# Patient Record
Sex: FEMALE | Race: BLACK OR AFRICAN AMERICAN | NOT HISPANIC OR LATINO | URBAN - METROPOLITAN AREA
[De-identification: names, ages, dates, MRNs, and addresses within clinical notes are randomized per-mention and may not be internally consistent; named-entity substitution may affect disease eponyms.]

---

## 2018-12-10 ENCOUNTER — EMERGENCY (EMERGENCY)
Facility: HOSPITAL | Age: 23
LOS: 1 days | Discharge: HOME | End: 2018-12-10
Attending: EMERGENCY MEDICINE | Admitting: EMERGENCY MEDICINE
Payer: MEDICAID

## 2018-12-10 VITALS
WEIGHT: 130.07 LBS | SYSTOLIC BLOOD PRESSURE: 130 MMHG | HEART RATE: 98 BPM | DIASTOLIC BLOOD PRESSURE: 84 MMHG | OXYGEN SATURATION: 98 % | RESPIRATION RATE: 16 BRPM | TEMPERATURE: 98 F

## 2018-12-10 DIAGNOSIS — Z88.1 ALLERGY STATUS TO OTHER ANTIBIOTIC AGENTS STATUS: ICD-10-CM

## 2018-12-10 DIAGNOSIS — Z91.018 ALLERGY TO OTHER FOODS: ICD-10-CM

## 2018-12-10 DIAGNOSIS — Z79.2 LONG TERM (CURRENT) USE OF ANTIBIOTICS: ICD-10-CM

## 2018-12-10 DIAGNOSIS — B34.9 VIRAL INFECTION, UNSPECIFIED: ICD-10-CM

## 2018-12-10 DIAGNOSIS — Z88.2 ALLERGY STATUS TO SULFONAMIDES: ICD-10-CM

## 2018-12-10 DIAGNOSIS — Z91.012 ALLERGY TO EGGS: ICD-10-CM

## 2018-12-10 DIAGNOSIS — Z88.0 ALLERGY STATUS TO PENICILLIN: ICD-10-CM

## 2018-12-10 DIAGNOSIS — Z91.010 ALLERGY TO PEANUTS: ICD-10-CM

## 2018-12-10 DIAGNOSIS — Z79.899 OTHER LONG TERM (CURRENT) DRUG THERAPY: ICD-10-CM

## 2018-12-10 DIAGNOSIS — R11.0 NAUSEA: ICD-10-CM

## 2018-12-10 DIAGNOSIS — Z90.5 ACQUIRED ABSENCE OF KIDNEY: Chronic | ICD-10-CM

## 2018-12-10 DIAGNOSIS — Z79.52 LONG TERM (CURRENT) USE OF SYSTEMIC STEROIDS: ICD-10-CM

## 2018-12-10 DIAGNOSIS — Z91.040 LATEX ALLERGY STATUS: ICD-10-CM

## 2018-12-10 DIAGNOSIS — Z98.89 OTHER SPECIFIED POSTPROCEDURAL STATES: Chronic | ICD-10-CM

## 2018-12-10 PROCEDURE — 99284 EMERGENCY DEPT VISIT MOD MDM: CPT | Mod: 25

## 2018-12-10 RX ORDER — KETOROLAC TROMETHAMINE 30 MG/ML
30 SYRINGE (ML) INJECTION ONCE
Qty: 0 | Refills: 0 | Status: DISCONTINUED | OUTPATIENT
Start: 2018-12-10 | End: 2018-12-11

## 2018-12-10 RX ORDER — SODIUM CHLORIDE 9 MG/ML
1000 INJECTION INTRAMUSCULAR; INTRAVENOUS; SUBCUTANEOUS ONCE
Qty: 0 | Refills: 0 | Status: COMPLETED | OUTPATIENT
Start: 2018-12-10 | End: 2018-12-10

## 2018-12-10 RX ORDER — ONDANSETRON 8 MG/1
4 TABLET, FILM COATED ORAL ONCE
Qty: 0 | Refills: 0 | Status: COMPLETED | OUTPATIENT
Start: 2018-12-10 | End: 2018-12-10

## 2018-12-11 VITALS
OXYGEN SATURATION: 100 % | DIASTOLIC BLOOD PRESSURE: 87 MMHG | HEART RATE: 90 BPM | RESPIRATION RATE: 16 BRPM | TEMPERATURE: 98 F | SYSTOLIC BLOOD PRESSURE: 115 MMHG

## 2018-12-11 LAB
ALBUMIN SERPL ELPH-MCNC: 4.1 G/DL — SIGNIFICANT CHANGE UP (ref 3.3–5)
ALP SERPL-CCNC: 65 U/L — SIGNIFICANT CHANGE UP (ref 40–120)
ALT FLD-CCNC: SIGNIFICANT CHANGE UP U/L (ref 10–45)
ANION GAP SERPL CALC-SCNC: 17 MMOL/L — SIGNIFICANT CHANGE UP (ref 5–17)
APPEARANCE UR: CLEAR — SIGNIFICANT CHANGE UP
AST SERPL-CCNC: SIGNIFICANT CHANGE UP U/L (ref 10–40)
BACTERIA # UR AUTO: PRESENT /HPF
BASOPHILS NFR BLD AUTO: 0.9 % — SIGNIFICANT CHANGE UP (ref 0–2)
BILIRUB SERPL-MCNC: 0.5 MG/DL — SIGNIFICANT CHANGE UP (ref 0.2–1.2)
BILIRUB UR-MCNC: NEGATIVE — SIGNIFICANT CHANGE UP
BUN SERPL-MCNC: 14 MG/DL — SIGNIFICANT CHANGE UP (ref 7–23)
CALCIUM SERPL-MCNC: 9.3 MG/DL — SIGNIFICANT CHANGE UP (ref 8.4–10.5)
CHLORIDE SERPL-SCNC: 101 MMOL/L — SIGNIFICANT CHANGE UP (ref 96–108)
CO2 SERPL-SCNC: 20 MMOL/L — LOW (ref 22–31)
COLOR SPEC: YELLOW — SIGNIFICANT CHANGE UP
CREAT SERPL-MCNC: 0.94 MG/DL — SIGNIFICANT CHANGE UP (ref 0.5–1.3)
DIFF PNL FLD: ABNORMAL
EOSINOPHIL NFR BLD AUTO: 0.7 % — SIGNIFICANT CHANGE UP (ref 0–6)
EPI CELLS # UR: SIGNIFICANT CHANGE UP /HPF (ref 0–5)
GLUCOSE SERPL-MCNC: 115 MG/DL — HIGH (ref 70–99)
GLUCOSE UR QL: NEGATIVE — SIGNIFICANT CHANGE UP
HCT VFR BLD CALC: 37.7 % — SIGNIFICANT CHANGE UP (ref 34.5–45)
HGB BLD-MCNC: 12.7 G/DL — SIGNIFICANT CHANGE UP (ref 11.5–15.5)
HYALINE CASTS # UR AUTO: SIGNIFICANT CHANGE UP /LPF (ref 0–2)
KETONES UR-MCNC: NEGATIVE — SIGNIFICANT CHANGE UP
LEUKOCYTE ESTERASE UR-ACNC: NEGATIVE — SIGNIFICANT CHANGE UP
LIDOCAIN IGE QN: 44 U/L — SIGNIFICANT CHANGE UP (ref 7–60)
LYMPHOCYTES # BLD AUTO: 52.6 % — HIGH (ref 13–44)
MCHC RBC-ENTMCNC: 26 PG — LOW (ref 27–34)
MCHC RBC-ENTMCNC: 33.7 G/DL — SIGNIFICANT CHANGE UP (ref 32–36)
MCV RBC AUTO: 77.3 FL — LOW (ref 80–100)
MONOCYTES NFR BLD AUTO: 13.2 % — SIGNIFICANT CHANGE UP (ref 2–14)
NEUTROPHILS NFR BLD AUTO: 32.6 % — LOW (ref 43–77)
NITRITE UR-MCNC: NEGATIVE — SIGNIFICANT CHANGE UP
PH UR: 7 — SIGNIFICANT CHANGE UP (ref 5–8)
PLATELET # BLD AUTO: 242 K/UL — SIGNIFICANT CHANGE UP (ref 150–400)
POTASSIUM SERPL-MCNC: SIGNIFICANT CHANGE UP MMOL/L (ref 3.5–5.3)
POTASSIUM SERPL-SCNC: SIGNIFICANT CHANGE UP MMOL/L (ref 3.5–5.3)
PROT SERPL-MCNC: 8.2 G/DL — SIGNIFICANT CHANGE UP (ref 6–8.3)
PROT UR-MCNC: 100 MG/DL
RAPID RVP RESULT: SIGNIFICANT CHANGE UP
RBC # BLD: 4.88 M/UL — SIGNIFICANT CHANGE UP (ref 3.8–5.2)
RBC # FLD: 14 % — SIGNIFICANT CHANGE UP (ref 10.3–16.9)
RBC CASTS # UR COMP ASSIST: ABNORMAL /HPF
SODIUM SERPL-SCNC: 138 MMOL/L — SIGNIFICANT CHANGE UP (ref 135–145)
SP GR SPEC: 1.02 — SIGNIFICANT CHANGE UP (ref 1–1.03)
UROBILINOGEN FLD QL: 0.2 E.U./DL — SIGNIFICANT CHANGE UP
WBC # BLD: 5.6 K/UL — SIGNIFICANT CHANGE UP (ref 3.8–10.5)
WBC # FLD AUTO: 5.6 K/UL — SIGNIFICANT CHANGE UP (ref 3.8–10.5)
WBC UR QL: ABNORMAL /HPF

## 2018-12-11 PROCEDURE — 36415 COLL VENOUS BLD VENIPUNCTURE: CPT

## 2018-12-11 PROCEDURE — 83690 ASSAY OF LIPASE: CPT

## 2018-12-11 PROCEDURE — 87486 CHLMYD PNEUM DNA AMP PROBE: CPT

## 2018-12-11 PROCEDURE — 99284 EMERGENCY DEPT VISIT MOD MDM: CPT | Mod: 25

## 2018-12-11 PROCEDURE — 85025 COMPLETE CBC W/AUTO DIFF WBC: CPT

## 2018-12-11 PROCEDURE — 80053 COMPREHEN METABOLIC PANEL: CPT

## 2018-12-11 PROCEDURE — 87184 SC STD DISK METHOD PER PLATE: CPT

## 2018-12-11 PROCEDURE — 87086 URINE CULTURE/COLONY COUNT: CPT

## 2018-12-11 PROCEDURE — 96374 THER/PROPH/DIAG INJ IV PUSH: CPT

## 2018-12-11 PROCEDURE — 87633 RESP VIRUS 12-25 TARGETS: CPT

## 2018-12-11 PROCEDURE — 87581 M.PNEUMON DNA AMP PROBE: CPT

## 2018-12-11 PROCEDURE — 87798 DETECT AGENT NOS DNA AMP: CPT

## 2018-12-11 PROCEDURE — 96375 TX/PRO/DX INJ NEW DRUG ADDON: CPT

## 2018-12-11 PROCEDURE — 81001 URINALYSIS AUTO W/SCOPE: CPT

## 2018-12-11 RX ADMIN — Medication 30 MILLIGRAM(S): at 02:24

## 2018-12-11 RX ADMIN — ONDANSETRON 4 MILLIGRAM(S): 8 TABLET, FILM COATED ORAL at 01:02

## 2018-12-11 RX ADMIN — SODIUM CHLORIDE 1000 MILLILITER(S): 9 INJECTION INTRAMUSCULAR; INTRAVENOUS; SUBCUTANEOUS at 01:01

## 2018-12-11 NOTE — ED PROVIDER NOTE - OBJECTIVE STATEMENT
24 y/o f with PMH of congenial hypoplastic kidney s/p nephrectomy at age 1 presents to ED with generalized symptoms of myalgia, intermittent fever.  Pt self-catheterizes suprapubic area for urine.  Has had multiple UTIs.  Complains of mild nausea.  Denies vomiting, fever, chills in ED.  No sore throat, cough, congestion.  No abd pain, no diarrhea.

## 2018-12-11 NOTE — ED PROVIDER NOTE - NSFOLLOWUPINSTRUCTIONS_ED_ALL_ED_FT
Follow up with your PMD this week.    Continue to drink fluids and take tylenol for fever and joint pain.    Return to ED with worsening symptoms or other concerns.

## 2018-12-11 NOTE — ED PROVIDER NOTE - MEDICAL DECISION MAKING DETAILS
Pt with fever, chills, myalgia, hx of self-catherization suprapubic and hx of UTIs, pe well appearing, no focal findings on exam, labs, rvp, u.a done with no obvious bacteria infection - most likely viral syndrome and recommend supportive care of fluids, tylenol.

## 2018-12-14 LAB
-  CLINDAMYCIN: SIGNIFICANT CHANGE UP
-  ERYTHROMYCIN: SIGNIFICANT CHANGE UP
-  LEVOFLOXACIN: SIGNIFICANT CHANGE UP
-  PENICILLIN: SIGNIFICANT CHANGE UP
-  VANCOMYCIN: SIGNIFICANT CHANGE UP
METHOD TYPE: SIGNIFICANT CHANGE UP
METHOD TYPE: SIGNIFICANT CHANGE UP

## 2018-12-15 LAB
-  CEFTRIAXONE: SIGNIFICANT CHANGE UP
CULTURE RESULTS: SIGNIFICANT CHANGE UP
ORGANISM # SPEC MICROSCOPIC CNT: SIGNIFICANT CHANGE UP
SPECIMEN SOURCE: SIGNIFICANT CHANGE UP

## 2021-04-27 ENCOUNTER — EMERGENCY (EMERGENCY)
Facility: HOSPITAL | Age: 26
LOS: 1 days | Discharge: HOME | End: 2021-04-27
Attending: EMERGENCY MEDICINE | Admitting: EMERGENCY MEDICINE
Payer: MEDICAID

## 2021-04-27 VITALS
OXYGEN SATURATION: 99 % | HEART RATE: 94 BPM | DIASTOLIC BLOOD PRESSURE: 78 MMHG | RESPIRATION RATE: 19 BRPM | SYSTOLIC BLOOD PRESSURE: 126 MMHG | TEMPERATURE: 99 F

## 2021-04-27 VITALS
WEIGHT: 158.07 LBS | TEMPERATURE: 102 F | HEIGHT: 59 IN | RESPIRATION RATE: 18 BRPM | SYSTOLIC BLOOD PRESSURE: 126 MMHG | HEART RATE: 145 BPM | OXYGEN SATURATION: 97 % | DIASTOLIC BLOOD PRESSURE: 91 MMHG

## 2021-04-27 DIAGNOSIS — R00.0 TACHYCARDIA, UNSPECIFIED: ICD-10-CM

## 2021-04-27 DIAGNOSIS — Z91.018 ALLERGY TO OTHER FOODS: ICD-10-CM

## 2021-04-27 DIAGNOSIS — Z79.52 LONG TERM (CURRENT) USE OF SYSTEMIC STEROIDS: ICD-10-CM

## 2021-04-27 DIAGNOSIS — R50.9 FEVER, UNSPECIFIED: ICD-10-CM

## 2021-04-27 DIAGNOSIS — Z90.5 ACQUIRED ABSENCE OF KIDNEY: Chronic | ICD-10-CM

## 2021-04-27 DIAGNOSIS — Z91.040 LATEX ALLERGY STATUS: ICD-10-CM

## 2021-04-27 DIAGNOSIS — Z91.012 ALLERGY TO EGGS: ICD-10-CM

## 2021-04-27 DIAGNOSIS — Z88.0 ALLERGY STATUS TO PENICILLIN: ICD-10-CM

## 2021-04-27 DIAGNOSIS — Z98.89 OTHER SPECIFIED POSTPROCEDURAL STATES: Chronic | ICD-10-CM

## 2021-04-27 DIAGNOSIS — Z91.010 ALLERGY TO PEANUTS: ICD-10-CM

## 2021-04-27 DIAGNOSIS — Z88.1 ALLERGY STATUS TO OTHER ANTIBIOTIC AGENTS STATUS: ICD-10-CM

## 2021-04-27 DIAGNOSIS — I10 ESSENTIAL (PRIMARY) HYPERTENSION: ICD-10-CM

## 2021-04-27 DIAGNOSIS — M79.10 MYALGIA, UNSPECIFIED SITE: ICD-10-CM

## 2021-04-27 DIAGNOSIS — Z88.8 ALLERGY STATUS TO OTHER DRUGS, MEDICAMENTS AND BIOLOGICAL SUBSTANCES: ICD-10-CM

## 2021-04-27 DIAGNOSIS — Z90.5 ACQUIRED ABSENCE OF KIDNEY: ICD-10-CM

## 2021-04-27 DIAGNOSIS — Z88.2 ALLERGY STATUS TO SULFONAMIDES: ICD-10-CM

## 2021-04-27 LAB
ALBUMIN SERPL ELPH-MCNC: 4.4 G/DL — SIGNIFICANT CHANGE UP (ref 3.3–5)
ALP SERPL-CCNC: 107 U/L — SIGNIFICANT CHANGE UP (ref 40–120)
ALT FLD-CCNC: 10 U/L — SIGNIFICANT CHANGE UP (ref 10–45)
ANION GAP SERPL CALC-SCNC: 12 MMOL/L — SIGNIFICANT CHANGE UP (ref 5–17)
APPEARANCE UR: ABNORMAL
APTT BLD: 30.5 SEC — SIGNIFICANT CHANGE UP (ref 27.5–35.5)
AST SERPL-CCNC: 12 U/L — SIGNIFICANT CHANGE UP (ref 10–40)
BACTERIA # UR AUTO: PRESENT /HPF
BASOPHILS # BLD AUTO: 0.03 K/UL — SIGNIFICANT CHANGE UP (ref 0–0.2)
BASOPHILS NFR BLD AUTO: 0.2 % — SIGNIFICANT CHANGE UP (ref 0–2)
BILIRUB SERPL-MCNC: 0.5 MG/DL — SIGNIFICANT CHANGE UP (ref 0.2–1.2)
BILIRUB UR-MCNC: NEGATIVE — SIGNIFICANT CHANGE UP
BUN SERPL-MCNC: 15 MG/DL — SIGNIFICANT CHANGE UP (ref 7–23)
CALCIUM SERPL-MCNC: 9.8 MG/DL — SIGNIFICANT CHANGE UP (ref 8.4–10.5)
CHLORIDE SERPL-SCNC: 104 MMOL/L — SIGNIFICANT CHANGE UP (ref 96–108)
CO2 SERPL-SCNC: 20 MMOL/L — LOW (ref 22–31)
COLOR SPEC: YELLOW — SIGNIFICANT CHANGE UP
CREAT SERPL-MCNC: 1.32 MG/DL — HIGH (ref 0.5–1.3)
DIFF PNL FLD: ABNORMAL
EOSINOPHIL # BLD AUTO: 0.01 K/UL — SIGNIFICANT CHANGE UP (ref 0–0.5)
EOSINOPHIL NFR BLD AUTO: 0.1 % — SIGNIFICANT CHANGE UP (ref 0–6)
EPI CELLS # UR: SIGNIFICANT CHANGE UP /HPF (ref 0–5)
GLUCOSE SERPL-MCNC: 135 MG/DL — HIGH (ref 70–99)
GLUCOSE UR QL: NEGATIVE — SIGNIFICANT CHANGE UP
HCT VFR BLD CALC: 40.4 % — SIGNIFICANT CHANGE UP (ref 34.5–45)
HGB BLD-MCNC: 12.8 G/DL — SIGNIFICANT CHANGE UP (ref 11.5–15.5)
IMM GRANULOCYTES NFR BLD AUTO: 0.6 % — SIGNIFICANT CHANGE UP (ref 0–1.5)
INR BLD: 1.29 — HIGH (ref 0.88–1.16)
KETONES UR-MCNC: NEGATIVE — SIGNIFICANT CHANGE UP
LEUKOCYTE ESTERASE UR-ACNC: ABNORMAL
LYMPHOCYTES # BLD AUTO: 1.08 K/UL — SIGNIFICANT CHANGE UP (ref 1–3.3)
LYMPHOCYTES # BLD AUTO: 6.4 % — LOW (ref 13–44)
MCHC RBC-ENTMCNC: 25.9 PG — LOW (ref 27–34)
MCHC RBC-ENTMCNC: 31.7 GM/DL — LOW (ref 32–36)
MCV RBC AUTO: 81.6 FL — SIGNIFICANT CHANGE UP (ref 80–100)
MONOCYTES # BLD AUTO: 1.02 K/UL — HIGH (ref 0–0.9)
MONOCYTES NFR BLD AUTO: 6 % — SIGNIFICANT CHANGE UP (ref 2–14)
NEUTROPHILS # BLD AUTO: 14.68 K/UL — HIGH (ref 1.8–7.4)
NEUTROPHILS NFR BLD AUTO: 86.7 % — HIGH (ref 43–77)
NITRITE UR-MCNC: NEGATIVE — SIGNIFICANT CHANGE UP
NRBC # BLD: 0 /100 WBCS — SIGNIFICANT CHANGE UP (ref 0–0)
PH UR: 7 — SIGNIFICANT CHANGE UP (ref 5–8)
PLATELET # BLD AUTO: 305 K/UL — SIGNIFICANT CHANGE UP (ref 150–400)
POTASSIUM SERPL-MCNC: 4.1 MMOL/L — SIGNIFICANT CHANGE UP (ref 3.5–5.3)
POTASSIUM SERPL-SCNC: 4.1 MMOL/L — SIGNIFICANT CHANGE UP (ref 3.5–5.3)
PROT SERPL-MCNC: 8.6 G/DL — HIGH (ref 6–8.3)
PROT UR-MCNC: 30 MG/DL
PROTHROM AB SERPL-ACNC: 15.3 SEC — HIGH (ref 10.6–13.6)
RBC # BLD: 4.95 M/UL — SIGNIFICANT CHANGE UP (ref 3.8–5.2)
RBC # FLD: 13.9 % — SIGNIFICANT CHANGE UP (ref 10.3–14.5)
RBC CASTS # UR COMP ASSIST: < 5 /HPF — SIGNIFICANT CHANGE UP
SODIUM SERPL-SCNC: 136 MMOL/L — SIGNIFICANT CHANGE UP (ref 135–145)
SP GR SPEC: 1.01 — SIGNIFICANT CHANGE UP (ref 1–1.03)
UROBILINOGEN FLD QL: 0.2 E.U./DL — SIGNIFICANT CHANGE UP
WBC # BLD: 16.93 K/UL — HIGH (ref 3.8–10.5)
WBC # FLD AUTO: 16.93 K/UL — HIGH (ref 3.8–10.5)
WBC UR QL: ABNORMAL /HPF

## 2021-04-27 PROCEDURE — 36415 COLL VENOUS BLD VENIPUNCTURE: CPT

## 2021-04-27 PROCEDURE — 80053 COMPREHEN METABOLIC PANEL: CPT

## 2021-04-27 PROCEDURE — 87086 URINE CULTURE/COLONY COUNT: CPT

## 2021-04-27 PROCEDURE — 96375 TX/PRO/DX INJ NEW DRUG ADDON: CPT

## 2021-04-27 PROCEDURE — 85610 PROTHROMBIN TIME: CPT

## 2021-04-27 PROCEDURE — 71045 X-RAY EXAM CHEST 1 VIEW: CPT | Mod: 26

## 2021-04-27 PROCEDURE — 99284 EMERGENCY DEPT VISIT MOD MDM: CPT | Mod: 25

## 2021-04-27 PROCEDURE — 81001 URINALYSIS AUTO W/SCOPE: CPT

## 2021-04-27 PROCEDURE — 87040 BLOOD CULTURE FOR BACTERIA: CPT

## 2021-04-27 PROCEDURE — 96361 HYDRATE IV INFUSION ADD-ON: CPT

## 2021-04-27 PROCEDURE — 93005 ELECTROCARDIOGRAM TRACING: CPT

## 2021-04-27 PROCEDURE — 85730 THROMBOPLASTIN TIME PARTIAL: CPT

## 2021-04-27 PROCEDURE — 96365 THER/PROPH/DIAG IV INF INIT: CPT

## 2021-04-27 PROCEDURE — 85025 COMPLETE CBC W/AUTO DIFF WBC: CPT

## 2021-04-27 PROCEDURE — 71045 X-RAY EXAM CHEST 1 VIEW: CPT

## 2021-04-27 PROCEDURE — 87186 SC STD MICRODIL/AGAR DIL: CPT

## 2021-04-27 RX ORDER — CEFTRIAXONE 500 MG/1
1000 INJECTION, POWDER, FOR SOLUTION INTRAMUSCULAR; INTRAVENOUS ONCE
Refills: 0 | Status: DISCONTINUED | OUTPATIENT
Start: 2021-04-27 | End: 2021-04-27

## 2021-04-27 RX ORDER — KETOROLAC TROMETHAMINE 30 MG/ML
15 SYRINGE (ML) INJECTION ONCE
Refills: 0 | Status: DISCONTINUED | OUTPATIENT
Start: 2021-04-27 | End: 2021-04-27

## 2021-04-27 RX ORDER — SODIUM CHLORIDE 9 MG/ML
2200 INJECTION INTRAMUSCULAR; INTRAVENOUS; SUBCUTANEOUS ONCE
Refills: 0 | Status: COMPLETED | OUTPATIENT
Start: 2021-04-27 | End: 2021-04-27

## 2021-04-27 RX ORDER — CIPROFLOXACIN LACTATE 400MG/40ML
400 VIAL (ML) INTRAVENOUS ONCE
Refills: 0 | Status: COMPLETED | OUTPATIENT
Start: 2021-04-27 | End: 2021-04-27

## 2021-04-27 RX ORDER — ACETAMINOPHEN 500 MG
650 TABLET ORAL ONCE
Refills: 0 | Status: DISCONTINUED | OUTPATIENT
Start: 2021-04-27 | End: 2021-04-27

## 2021-04-27 RX ORDER — CIPROFLOXACIN LACTATE 400MG/40ML
1 VIAL (ML) INTRAVENOUS
Qty: 14 | Refills: 0
Start: 2021-04-27 | End: 2021-05-03

## 2021-04-27 RX ORDER — ACETAMINOPHEN 500 MG
650 TABLET ORAL ONCE
Refills: 0 | Status: COMPLETED | OUTPATIENT
Start: 2021-04-27 | End: 2021-04-27

## 2021-04-27 RX ADMIN — Medication 200 MILLIGRAM(S): at 21:15

## 2021-04-27 RX ADMIN — Medication 650 MILLIGRAM(S): at 20:10

## 2021-04-27 RX ADMIN — SODIUM CHLORIDE 2200 MILLILITER(S): 9 INJECTION INTRAMUSCULAR; INTRAVENOUS; SUBCUTANEOUS at 19:26

## 2021-04-27 RX ADMIN — SODIUM CHLORIDE 2200 MILLILITER(S): 9 INJECTION INTRAMUSCULAR; INTRAVENOUS; SUBCUTANEOUS at 20:30

## 2021-04-27 RX ADMIN — Medication 15 MILLIGRAM(S): at 19:26

## 2021-04-27 RX ADMIN — Medication 15 MILLIGRAM(S): at 20:10

## 2021-04-27 RX ADMIN — Medication 650 MILLIGRAM(S): at 19:26

## 2021-04-27 RX ADMIN — Medication 400 MILLIGRAM(S): at 22:15

## 2021-04-27 NOTE — ED ADULT TRIAGE NOTE - CHIEF COMPLAINT QUOTE
pt c/o body aches since yesterday. denies CP, SOB, cough, sore throat. pt has hx of 1 kidney, straight cath's herself for urine.

## 2021-04-27 NOTE — ED PROVIDER NOTE - CLINICAL SUMMARY MEDICAL DECISION MAKING FREE TEXT BOX
here w/ fevers, tachycardia. will check pt for UTI, COVID-19. if neg, likely viral sepsis. DC home in NAD with strict return precautions given.

## 2021-04-27 NOTE — ED PROVIDER NOTE - PATIENT PORTAL LINK FT
You can access the FollowMyHealth Patient Portal offered by [facility] by registering at the following website: [email or web address]. By joining [facility]’s FollowMyHealth portal, you will also be able to view your health information using other applications (apps) compatible with our system.

## 2021-04-27 NOTE — ED ADULT NURSE NOTE - OBJECTIVE STATEMENT
Pt presents reporting fevers, body aches at home. Pt reporets hx of hypoplastic kidney, s/p nephrectomy as a young child. Pt reports intermittent catheterization via abdominal catheter 3-4hrs. Pt denies new pain with catheterization, denies foul odor to urine.

## 2021-04-27 NOTE — ED PROVIDER NOTE - PHYSICAL EXAMINATION
CONSTITUTIONAL: Well-appearing; well-nourished; in no apparent distress.   HEAD: Normocephalic; atraumatic.   EYES:  conjunctiva and sclera clear  ENT: normal nose; no rhinorrhea; normal pharynx with no erythema or lesions.   NECK: Supple; non-tender;   CARDIOVASCULAR: Normal S1, S2; no murmurs, rubs, or gallops. tachycardic   RESPIRATORY: Breathing easily; breath sounds clear and equal bilaterally; no wheezes, rhonchi, or rales.  GI: Soft; non-distended; non-tender; no palpable organomegaly.   EXT: No cyanosis or edema; N/V intact  SKIN: Normal for age and race; warm; dry; good turgor; no apparent lesions or rash.   NEURO: A & O x 3; face symmetric; grossly unremarkable.   PSYCHOLOGICAL: The patient’s mood and manner are appropriate.

## 2021-04-27 NOTE — ED ADULT NURSE REASSESSMENT NOTE - NS ED NURSE REASSESS COMMENT FT1
IV ABX initiated as ordered, will d/c upon completion, pt. aware, with understanding verbalized, assessment on-going

## 2021-04-27 NOTE — ED PROVIDER NOTE - NSFOLLOWUPINSTRUCTIONS_ED_ALL_ED_FT
Fever    A fever is an increase in the body's temperature above 100.4°F (38°C) or higher. In adults and children older than three months, a brief mild or moderate fever generally has no long-term effect, and it usually does not require treatment. Many times, fevers are the result of viral infections, which are self-resolving.  However, certain symptoms or diagnostic tests may suggest a bacterial infection that may respond to antibiotics. Take medications as directed by your health care provider.    SEEK IMMEDIATE MEDICAL CARE IF YOU OR YOUR CHILD HAVE ANY OF THE FOLLOWING SYMPTOMS : shortness of breath, seizure, rash/stiff neck/headache, severe abdominal pain, persistent vomiting, any signs of dehydration, or if your child has a fever for over five (5) days. Fever    A fever is an increase in the body's temperature above 100.4°F (38°C) or higher. In adults and children older than three months, a brief mild or moderate fever generally has no long-term effect, and it usually does not require treatment. Many times, fevers are the result of viral infections, which are self-resolving.  However, certain symptoms or diagnostic tests may suggest a bacterial infection that may respond to antibiotics. Take medications as directed by your health care provider.    SEEK IMMEDIATE MEDICAL CARE IF YOU OR YOUR CHILD HAVE ANY OF THE FOLLOWING SYMPTOMS : shortness of breath, seizure, rash/stiff neck/headache, severe abdominal pain, persistent vomiting, any signs of dehydration, or if your child has a fever for over five (5) days.    Urinary Tract Infection    A urinary tract infection (UTI) is an infection of any part of the urinary tract, which includes the kidneys, ureters, bladder, and urethra. Risk factors include ignoring your need to urinate, wiping back to front if female, being an uncircumcised male, and having diabetes or a weak immune system. Symptoms include frequent urination, pain or burning with urination, foul smelling urine, cloudy urine, pain in the lower abdomen, blood in the urine, and fever. If you were prescribed an antibiotic medicine, take it as told by your health care provider. Do not stop taking the antibiotic even if you start to feel better.    SEEK IMMEDIATE MEDICAL CARE IF YOU HAVE ANY OF THE FOLLOWING SYMPTOMS: severe back or abdominal pain, fever, inability to keep fluids or medicine down, dizziness/lightheadedness, or a change in mental status.

## 2021-04-27 NOTE — ED PROVIDER NOTE - OBJECTIVE STATEMENT
20 yo female with PMHx of HTN, left hypoplastic kidney s/p left nephroureterectomy at 1yr-old, urethra reconstruction surgery at 4yr-old and intermittent self-catheterized q3-4hrs through umbilicus since age of 5, recurrent UTIs but none recently, here w/ fevers and myalgias x 3 days. statse unlike prior utis, urine on caths has been clean and without new odor. no n/v, but poor appetite, hasnt eaten since yesterday and did not think to increase fluid intake. no abdominal pain, flank pain. no cough, congestion, cp, sob. no exposure to covid 19 but not yet vaccinated.

## 2021-04-29 LAB
-  AMPICILLIN/SULBACTAM: SIGNIFICANT CHANGE UP
-  AMPICILLIN: SIGNIFICANT CHANGE UP
-  CEFAZOLIN: SIGNIFICANT CHANGE UP
-  CEFTRIAXONE: SIGNIFICANT CHANGE UP
-  CIPROFLOXACIN: SIGNIFICANT CHANGE UP
-  ERTAPENEM: SIGNIFICANT CHANGE UP
-  GENTAMICIN: SIGNIFICANT CHANGE UP
-  NITROFURANTOIN: SIGNIFICANT CHANGE UP
-  PIPERACILLIN/TAZOBACTAM: SIGNIFICANT CHANGE UP
-  TOBRAMYCIN: SIGNIFICANT CHANGE UP
-  TRIMETHOPRIM/SULFAMETHOXAZOLE: SIGNIFICANT CHANGE UP
CULTURE RESULTS: SIGNIFICANT CHANGE UP
METHOD TYPE: SIGNIFICANT CHANGE UP
ORGANISM # SPEC MICROSCOPIC CNT: SIGNIFICANT CHANGE UP
ORGANISM # SPEC MICROSCOPIC CNT: SIGNIFICANT CHANGE UP
SPECIMEN SOURCE: SIGNIFICANT CHANGE UP

## 2021-05-02 LAB
CULTURE RESULTS: SIGNIFICANT CHANGE UP
CULTURE RESULTS: SIGNIFICANT CHANGE UP
SPECIMEN SOURCE: SIGNIFICANT CHANGE UP
SPECIMEN SOURCE: SIGNIFICANT CHANGE UP

## 2021-10-26 ENCOUNTER — APPOINTMENT (OUTPATIENT)
Dept: NEPHROLOGY | Facility: CLINIC | Age: 26
End: 2021-10-26
Payer: MEDICAID

## 2021-10-26 VITALS — HEIGHT: 60 IN | BODY MASS INDEX: 30.04 KG/M2 | WEIGHT: 153 LBS

## 2021-10-26 VITALS — SYSTOLIC BLOOD PRESSURE: 128 MMHG | HEART RATE: 69 BPM | DIASTOLIC BLOOD PRESSURE: 93 MMHG

## 2021-10-26 DIAGNOSIS — Z78.9 OTHER SPECIFIED HEALTH STATUS: ICD-10-CM

## 2021-10-26 DIAGNOSIS — J45.909 UNSPECIFIED ASTHMA, UNCOMPLICATED: ICD-10-CM

## 2021-10-26 PROCEDURE — 99204 OFFICE O/P NEW MOD 45 MIN: CPT | Mod: 25

## 2021-10-26 RX ORDER — LEVONORGESTREL AND ETHINYL ESTRADIOL AND ETHINYL ESTRADIOL 150-30(84)
KIT ORAL
Refills: 0 | Status: ACTIVE | COMMUNITY

## 2021-10-26 NOTE — ASSESSMENT
[FreeTextEntry1] : # Borderline hypertension likeliest due to renal parenchymal disease due to hyperfiltration/focal sclerosis.\par * Will consider starting amlodipine 2.5 if BP remains elevated at home. \par * The patient's blood pressure was checked with the [employer]ron [id number]-[id number] using the SPRINT trial protocol after sitting quietly in an empty room with arm supported, back supported, and feet on the floor for 5 minutes. The average of 3 readings were taken.\par * A counseling information sheet has been given (today). All their questions were answered.\par * The patient has been counseled to check their BP at home with an automatic arm cuff, write down the readings, and reach me directly on the phone immediately if they are persistently > 180 systolic or if SBP is less than 100 or if lightheadedness develops. They were counseled to bring in all blood pressure readings and medications next visit.\par * The patient has been counseled that regular office followup (at least every 1 month for now)  is important for monitoring and for their health, and that it is their responsibility to make follow up appointments.\par * The patient also has been counseled that they must never stop or change any medications without discussing this with me (or another physician). 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 All their questions were answered.\par * The patient has been counseled that chronic kidney disease is a significant condition and regular office followup with me (at least every 1 month for now) is important for monitoring and their health, and that it is their responsibility to make a follow up appointment.\par * The patient has been counseled never to stop taking their medications without discussing it with me or another doctor.\par * The patient has been counseled on avoiding NSAIDs.\par * The patient has been counseled on risk of acute renal failure and instructed to immediately call and speak with me or go immediately to ER with any severe symptoms, nausea, vomiting, diarrhea, chest pain, or shortness of breath.\par

## 2021-10-26 NOTE — HISTORY OF PRESENT ILLNESS
[FreeTextEntry1] : Kindly referred for HTN and nephrectomy by Dr. Crystal Randolph. She is here with her mother who also provided history and was given counseling. Urologist: Dr. Crystal Randolph (Fax [phone] ) She has a documented allergy to Covid vaccine (by allergist). \par \par * HTN initially diagnosed age 10. She was treated with medication for 1 year. HTN currently uncontrolled, but she is not on medications. She does not check her BP at home. Her BP was 130/86 on 9/15/21. 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  ACP labs reviewed. Her most recent creatinine was 1.02 in September 2021. Labs from September 2021 from Dr. Randolph reviewed. . She had no proteinuria or hematuria on U/A this year. US on 9/21 showed 14 cm right kidney, echogenic, without hydro. Upper pole caliectasis (chronic) noted by Dr. Randolph. \par \par * She self catheterizes through a green in her umbilicus. She catheterizes every 3 hours. \par \par BMI 29.8. \par \par * She requires birth control to reduce peritoneal cyst fluid per urology. She says that this hasn't raised her blood pressure. (This was started in September.)

## 2021-10-26 NOTE — CONSULT LETTER
[FreeTextEntry1] : Dear Dr. Randolph,\par \par I had the pleasure of seeing Aurora Hill in followup for kidney disease and hypertension. My note is attached.\par \par Thank you for the opportunity to participate in the care of your patient.\par \par Please call me on my mobile phone at [phone] or in the office at [phone] if you have any questions.\par \par Best regards,\par \par Enoch\par \par Enoch Andre MD, FACP, FASN\par [address], [place]\par [email or web address]\par Office: [phone]\par Mobile: [phone]

## 2021-11-30 ENCOUNTER — APPOINTMENT (OUTPATIENT)
Dept: NEPHROLOGY | Facility: CLINIC | Age: 26
End: 2021-11-30
Payer: MEDICAID

## 2021-11-30 VITALS — DIASTOLIC BLOOD PRESSURE: 101 MMHG | HEART RATE: 70 BPM | SYSTOLIC BLOOD PRESSURE: 141 MMHG

## 2021-11-30 PROCEDURE — 99214 OFFICE O/P EST MOD 30 MIN: CPT

## 2021-11-30 NOTE — ASSESSMENT
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 Benefits, alternatives, and risks to medication including but not limited to low BP discussed and they consent. UpToDate patient information on medication provided. All their questions were answered. \par * The patient's blood pressure was checked with the Omron HEM-[id number] using the SPRINT trial protocol after sitting quietly in an empty room with arm supported, back supported, and feet on the floor for 5 minutes. The average of 3 readings were taken.\par * A counseling information sheet has been given (today). All their questions were answered.\par * The patient has been counseled to check their BP at home with an automatic arm cuff, write down the readings, and reach me directly on the phone immediately if they are persistently > 180 systolic or if SBP is less than 100 or if lightheadedness develops. They were counseled to bring in all blood pressure readings and medications next visit.\par * The patient has been counseled that regular office followup (at least every 1 month for now) is important for monitoring and for their health, and that it is their responsibility to make follow up appointments.\par * The patient also has been counseled that they must never stop or change any medications without discussing this with me (or another physician). 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 All their questions were answered.\par * The patient has been counseled that chronic kidney disease is a significant condition and regular office followup with me (at least every 1 month for now) is important for monitoring and their health, and that it is their responsibility to make a follow up appointment.\par * The patient has been counseled never to stop taking their medications without discussing it with me or another doctor.\par * The patient has been counseled on avoiding NSAIDs.\par * The patient has been counseled on risk of acute renal failure and instructed to immediately call and speak with me or go immediately to ER with any severe symptoms, nausea, vomiting, diarrhea, chest pain, or shortness of breath.\par

## 2021-11-30 NOTE — CONSULT LETTER
[FreeTextEntry1] : Dear Dr. Randolph,\par \par I had the pleasure of seeing Aurora Hill in followup for kidney disease. My note is attached.\par \par Thank you for the opportunity to participate in the care of your patient.\par \par Please call me on my mobile phone at [phone] or in the office at [phone] if you have any questions.\par \par Best regards,\par \par Enoch\par \par Enoch Andre MD, FACP, FASN\par [address], [place]\par [email or web address]\par Office: [phone]\par Mobile: [phone]

## 2021-11-30 NOTE — HISTORY OF PRESENT ILLNESS
[FreeTextEntry1] : Kindly referred for HTN and nephrectomy by Dr. Crystal Randolph. She is here with her mother who also provided history and was given counseling. Urologist: Dr. Crystal Randolph (Fax [phone] ) She has a documented allergy to Covid vaccine (by allergist). \par \par * She has not yet checked BP at home.  * She self catheterizes through a green in her umbilicus. She catheterizes every 3 hours.  * CKD stable s/p nephrectomy, creatinine 1.11 and normal cystatin C eGFR> \par \par Previous history (26Oct21): * HTN initially diagnosed age 10. She was treated with medication for 1 year. HTN currently uncontrolled, but she is not on medications. She does not check her BP at home. Her BP was 130/86 on 9/15/21. \par \par * Left nephrectomy [id number] for dysplasic kidney with atopic ureter.  ACP labs reviewed. Her most recent creatinine was 1.02 in September 2021. Labs from September 2021 from Dr. Randolph reviewed. . She had no proteinuria or hematuria on U/A this year. US on 9/21 showed 14 cm right kidney, echogenic, without hydro. Upper pole caliectasis (chronic) noted by Dr. Randolph. \par \par * She self catheterizes through a green in her umbilicus. She catheterizes every 3 hours. \par \par BMI 29.8. \par \par * She requires birth control to reduce peritoneal cyst fluid per urology. She says that this hasn't raised her blood pressure. (This was started in September.)

## 2022-01-11 NOTE — ED PROVIDER NOTE - PMH
Subjective  Chief Complaint   Patient presents with    New Patient     To get established patient used to see Dr. Krupa Coley      HPI:  Laney Whitehead is a 60 y.o. female. Patient used to see a primary care provider at one of our sister offices but that provider is no longer working with the group. Patient reports that she really has not needed primary care except for annually her insurance requires her to get referrals to see her specialists. She follows with endocrinology for her history of elevated sugars, Hashimoto's, and vitamin D deficiency. She follows with Deborah Ramires for women's health and history of vulvar dysplastic lesion. Our chart has her listed as having a history of diabetes but patient believes that it is actually prediabetes. She cannot recall what her last A1c was. She has her next appointment with endocrinology already set for later this month and going on For March. She denies any acute issues or concerns today.     Past Medical History:   Diagnosis Date    Allergies     Cardiac arrhythmia     Diabetes ([address]    [address] Hypothyroid      Family History   Problem Relation Age of Onset    Anemia Other     Depression Other     Hypertension Other     Alcohol abuse Other     Diabetes Other     Heart Disease Other      Social History     Socioeconomic History    Marital status: SINGLE     Spouse name: Not on file    Number of children: Not on file    Years of education: Not on file    Highest education level: Not on file   Occupational History    Not on file   Tobacco Use    Smoking status: Current Every Day Smoker     Packs/day: 0.15     Types: Cigarettes    Smokeless tobacco: Never Used   Vaping Use    Vaping Use: Never used   Substance and Sexual Activity    Alcohol use: Yes     Comment: socially    Drug use: No    Sexual activity: Not on file   Other Topics Concern    Not on file   Social History Narrative    Not on file     Social Determinants of Health     Financial Resource Strain:     Difficulty of Paying Living Expenses: Not on file   Food Insecurity:     Worried About Running Out of Food in the Last Year: Not on file    Aisha of Food in the Last Year: Not on file   Transportation Needs:     Lack of Transportation (Medical): Not on file    Lack of Transportation (Non-Medical): Not on file   Physical Activity:     Days of Exercise per Week: Not on file    Minutes of Exercise per Session: Not on file   Stress:     Feeling of Stress : Not on file   Social Connections:     Frequency of Communication with Friends and Family: Not on file    Frequency of Social Gatherings with Friends and Family: Not on file    Attends Confucianism Services: Not on file    Active Member of [address] [place] [address] or Organizations: Not on file    Attends Club or Organization Meetings: Not on file    Marital Status: Not on file   Intimate Partner Violence:     Fear of Current or Ex-Partner: Not on file    Emotionally Abused: Not on file    Physically Abused: Not on file    Sexually Abused: Not on file   Housing Stability:     Unable to Pay for Housing in the Last Year: Not on file    Number of [place] in the Last Year: Not on file    Unstable Housing in the Last Year: Not on file     Current Outpatient Medications on File Prior to Visit   Medication Sig Dispense Refill    Mimvey 1-0.5 mg per tablet Take 1 Tablet by mouth daily.  cyanocobalamin, vitamin B-12, (VITAMIN B-12 PO) Take  by mouth.  levothyroxine (Unithroid) 112 mcg tablet Take 112 mcg by mouth Daily (before breakfast).  ergocalciferol (ERGOCALCIFEROL) 1,250 mcg (50,000 unit) capsule TAKE 1 CAPSULE ALTERNATING 2 TIMES PER WEEK WITH 3 TIMES PER WEEK      Lactobacillus acidophilus (PROBIOTIC PO) Take  by mouth.  liothyronine (Cytomel) 5 mcg tablet Take 5 mcg by mouth daily.  ferrous sulfate (Iron) 325 mg (65 mg iron) tablet Take  by mouth Daily (before breakfast).       metFORMIN (GLUCOPHAGE) 500 mg tablet H/O kidney removal    HTN (hypertension)     Take 1,000 mg by mouth daily.  [DISCONTINUED] fluconazole (Diflucan) 150 mg tablet Take 150 mg by mouth daily. FDA advises cautious prescribing of oral fluconazole in pregnancy. (Patient not taking: Reported on 1/11/2022)      [DISCONTINUED] estradiol-norethindrone (ACTIVELLA) 0.5-0.1 mg per tablet Take 1 Tab by mouth daily. (Patient not taking: Reported on 1/11/2022)      [DISCONTINUED] levothyroxine (Synthroid) 112 mcg tablet Take  by mouth Daily (before breakfast). (Patient not taking: Reported on 1/11/2022)      [DISCONTINUED] vit B Cmplx 3-FA-Vit C-Biotin (NEPHRO MEGA RX) 1- mg-mg-mcg tablet Take 1 Tab by mouth daily. (Patient not taking: Reported on 1/11/2022)       No current facility-administered medications on file prior to visit. Allergies   Allergen Reactions    Corticosteroids (Glucocorticoids) Unknown (comments)     Review of Systems   Constitutional: Negative for chills, fever and malaise/fatigue. Respiratory: Negative for cough, shortness of breath and wheezing. Cardiovascular: Negative for chest pain, palpitations and leg swelling. Gastrointestinal: Negative for diarrhea and vomiting. Genitourinary: Negative for dysuria. Neurological: Negative for dizziness, tingling and weakness. Psychiatric/Behavioral: Negative for depression. The patient is not nervous/anxious. Objective  Visit Vitals  /78 (BP 1 Location: Left upper arm, BP Patient Position: At rest, BP Cuff Size: Adult)   Pulse 69   Temp 97.3 °F (36.3 °C) (Temporal)   Ht 5' 7\" (1.702 m)   Wt 220 lb (99.8 kg)   SpO2 97%   BMI 34.46 kg/m²     Physical Exam  Constitutional:       General: She is not in acute distress. Appearance: Normal appearance. She is obese. HENT:      Head: Normocephalic and atraumatic. Neck:      Thyroid: No thyroid mass or thyromegaly. Cardiovascular:      Rate and Rhythm: Normal rate and regular rhythm. Heart sounds: No murmur heard.       Pulmonary:      Effort: Pulmonary effort is normal. No respiratory distress. Breath sounds: Normal breath sounds. No wheezing. Musculoskeletal:      Cervical back: Neck supple. No muscular tenderness. Right lower leg: No edema. Left lower leg: No edema. Lymphadenopathy:      Cervical: No cervical adenopathy. Skin:     General: Skin is warm and dry. Neurological:      Mental Status: She is alert and oriented to person, place, and time. Mental status is at baseline. Psychiatric:         Attention and Perception: Attention and perception normal.         Mood and Affect: Mood and affect normal.         Speech: Speech normal.         Behavior: Behavior normal.          Assessment & Plan    ICD-10-CM ICD-9-CM    1. Type 2 diabetes mellitus without complication, without long-term current use of insulin (HCC)  E11.9 250.00 REFERRAL TO ENDOCRINOLOGY   2. Hypothyroidism due to Hashimoto's thyroiditis  E03.8 244.8 REFERRAL TO ENDOCRINOLOGY    E06.3 245.2    3. Personal history of vulvar dysplasia  Z87. 412 V13.24 REFERRAL TO GYN ONCOLOGY   4. Vitamin D deficiency  E55.9 268.9 REFERRAL TO ENDOCRINOLOGY   5. Encounter for immunization  Z23 V03.89 ZOSTER VACC RECOMBINANT ADJUVANTED   6. Screening for viral disease  Z11.59 V73.99 HCV AB W/RFLX TO WANG     Diagnoses and all orders for this visit:    1. Type 2 diabetes mellitus without complication, without long-term current use of insulin (HCC)  -     REFERRAL TO ENDOCRINOLOGY  I reviewed with patient that I am going to leave her diagnosis in the chart as diabetes for the time being but will request records from the endocrinology office for clarification. If they have her listed as prediabetes and we will make adjustments at that time. 2. Hypothyroidism due to Hashimoto's thyroiditis  -     REFERRAL TO ENDOCRINOLOGY  Referral provided as requested. 3. Personal history of vulvar dysplasia  -     REFERRAL TO GYN ONCOLOGY  Referral provided as requested.     4. Vitamin D deficiency  -     REFERRAL TO ENDOCRINOLOGY  Referral provided as requested. 5. Encounter for immunization  -     ZOSTER VACC RECOMBINANT ADJUVANTED    6. Screening for viral disease  -     HCV AB W/RFLX TO WANG  Instead of having this drawn here today patient would prefer to take it with her and have it added the next time her endocrinologist draws blood work which should be later this month. Follow-up and Dispositions    · Return in about 1 year (around 1/11/2023) for wellness, NON fasting f/u.        Bryan Cleaning MD

## 2022-01-25 ENCOUNTER — APPOINTMENT (OUTPATIENT)
Dept: NEPHROLOGY | Facility: CLINIC | Age: 27
End: 2022-01-25
Payer: MEDICAID

## 2022-01-25 VITALS — DIASTOLIC BLOOD PRESSURE: 89 MMHG | HEART RATE: 75 BPM | SYSTOLIC BLOOD PRESSURE: 122 MMHG

## 2022-01-25 DIAGNOSIS — Z78.9 OTHER SPECIFIED HEALTH STATUS: ICD-10-CM

## 2022-01-25 PROCEDURE — 99214 OFFICE O/P EST MOD 30 MIN: CPT

## 2022-01-25 RX ORDER — AMLODIPINE BESYLATE 2.5 MG/1
2.5 TABLET ORAL DAILY
Qty: 90 | Refills: 3 | Status: DISCONTINUED | COMMUNITY
Start: 2021-11-30 | End: 2022-01-25

## 2022-01-25 NOTE — ASSESSMENT
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 Will consider starting BP med other than amlodpine. \par * The patient's blood pressure was checked with the Omron [id number]-[id number] using the SPRINT trial protocol after sitting quietly in an empty room with arm supported, back supported, and feet on the floor for 5 minutes. The average of 3 readings were taken.\par * A counseling information sheet has been given (today). All their questions were answered.\par * The patient has been counseled to check their BP at home with an automatic arm cuff, write down the readings, and reach me directly on the phone immediately if they are persistently > 180 systolic or if SBP is less than 100 or if lightheadedness develops. They were counseled to bring in all blood pressure readings and medications next visit.\par * The patient has been counseled that regular office followup (at least every 1 month for now) is important for monitoring and for their health, and that it is their responsibility to make follow up appointments.\par * The patient also has been counseled that they must never stop or change any medications without discussing this with me (or another physician). 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 \par * Therapies for kidney disease: other evidence-based therapies including exercise, a plant-based lower oxalate diet, and 400 mcg folic acid daily\par * Cardiovascular disease prevention: counseling on healthy diet, physical activity, weight loss, alcohol limitation, blood pressure control\par * A counseling information sheet has been given (today). All their questions were answered.\par * The patient has been counseled that chronic kidney disease is a significant condition and regular office followup with me (at least every 3 month for now) is important for monitoring and their health, and that it is their responsibility to make a follow up appointment.\par * The patient has been counseled never to stop taking their medications without discussing it with me or another doctor.\par * The patient has been counseled on avoiding NSAIDs.\par * The patient has been counseled on risk of acute renal failure and instructed to immediately call and speak with me or go immediately to ER with any severe symptoms, nausea, vomiting, diarrhea, chest pain, or shortness of breath.

## 2022-01-25 NOTE — HISTORY OF PRESENT ILLNESS
[FreeTextEntry1] : Kindly referred for HTN and nephrectomy by Dr. Crystal Randolph.Urologist: Dr. Crystal Randolph (Fax [phone] ) She has a documented allergy to Covid vaccine (by allergist). \par \par *  CKD stable s/p nephrectomy, creatinine 1.11 and normal cystatin C eGFR. * She stopped amlodipine because she felt it was giving her headaches. HTN borderline controlled, 120s/70s.   * She self catheterizes through a green in her umbilicus. She catheterizes every 3 hours.* Overweight stable. \par \par Previous history (30Nov21): * She has not yet checked BP at home.  * She self catheterizes through a green in her umbilicus. She catheterizes every 3 hours.  * CKD stable s/p nephrectomy, creatinine 1.11 and normal cystatin C eGFR> \par \par Previous history (26Oct21): * HTN initially diagnosed age 10. She was treated with medication for 1 year. HTN currently uncontrolled, but she is not on medications. She does not check her BP at home. Her BP was 130/86 on 9/15/21. 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  ACP labs reviewed. Her most recent creatinine was 1.02 in September 2021. Labs from September 2021 from Dr. Randolph reviewed. . She had no proteinuria or hematuria on U/A this year. US on 9/21 showed 14 cm right kidney, echogenic, without hydro. Upper pole caliectasis (chronic) noted by Dr. Randolph. \par \par * She self catheterizes through a green in her umbilicus. She catheterizes every 3 hours. \par \par BMI 29.8. \par \par * She requires birth control to reduce peritoneal cyst fluid per urology. She says that this hasn't raised her blood pressure. (This was started in September.)

## 2022-04-07 ENCOUNTER — APPOINTMENT (OUTPATIENT)
Dept: NEPHROLOGY | Facility: CLINIC | Age: 27
End: 2022-04-07
Payer: MEDICAID

## 2022-04-07 VITALS — DIASTOLIC BLOOD PRESSURE: 86 MMHG | HEART RATE: 78 BPM | SYSTOLIC BLOOD PRESSURE: 121 MMHG

## 2022-04-07 VITALS — TEMPERATURE: 96.4 F

## 2022-04-07 PROCEDURE — 99214 OFFICE O/P EST MOD 30 MIN: CPT

## 2022-04-07 NOTE — ASSESSMENT
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 White coat effect. \par * Follow BP at home. \par * The patient's blood pressure was checked with the Omron [id number]-[id number] using the SPRINT trial protocol after sitting quietly in an empty room with arm supported, back supported, and feet on the floor for 5 minutes. The average of 3 readings were taken.\par * A counseling information sheet has been given (today). All their questions were answered.\par * The patient has been counseled to check their BP at home with an automatic arm cuff, write down the readings, and reach me directly on the phone immediately if they are persistently > 180 systolic or if SBP is less than 100 or if lightheadedness develops. They were counseled to bring in all blood pressure readings and medications next visit.\par * The patient has been counseled that regular office followup (at least every 1 month for now) is important for monitoring and for their health, and that it is their responsibility to make follow up appointments.\par * The patient also has been counseled that they must never stop or change any medications without discussing this with me (or another physician). 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 \par * Therapies for kidney disease: other evidence-based therapies including exercise, a plant-based lower oxalate diet, and 400 mcg folic acid daily\par * Cardiovascular disease prevention: counseling on healthy diet, physical activity, weight loss, alcohol limitation, blood pressure control\par * A counseling information sheet has been given (today). All their questions were answered.\par * The patient has been counseled that chronic kidney disease is a significant condition and regular office followup with me (at least every 3 month for now) is important for monitoring and their health, and that it is their responsibility to make a follow up appointment.\par * The patient has been counseled never to stop taking their medications without discussing it with me or another doctor.\par * The patient has been counseled on avoiding NSAIDs.\par * The patient has been counseled on risk of acute renal failure and instructed to immediately call and speak with me or go immediately to ER with any severe symptoms, nausea, vomiting, diarrhea, chest pain, or shortness of breath.

## 2022-04-07 NOTE — HISTORY OF PRESENT ILLNESS
[FreeTextEntry1] : Kindly referred for HTN and nephrectomy by Dr. Crystal Randolph.Urologist: Dr. Crystal Randolph (Fax [phone] ) She has a documented allergy to Covid vaccine (by allergist). \par \par * HTN controlled.  at home. No lightheadedness. Compliant with medications.  * She self catheterizes through a green in her umbilicus. She catheterizes every 3 hours.* Overweight stable.  *  CKD stable s/p nephrectomy, creatinine 1.11 and normal cystatin C eGFR\par \par Previous history (25Jan22): *  CKD stable s/p nephrectomy, creatinine 1.11 and normal cystatin C eGFR. * She stopped amlodipine because she felt it was giving her headaches. HTN borderline controlled, 120s/70s.   * She self catheterizes through a green in her umbilicus. She catheterizes every 3 hours.* Overweight stable. \par \par Previous history (30Nov21): * She has not yet checked BP at home.  * She self catheterizes through a green in her umbilicus. She catheterizes every 3 hours.  * CKD stable s/p nephrectomy, creatinine 1.11 and normal cystatin C eGFR> \par \par Previous history (26Oct21): * HTN initially diagnosed age 10. She was treated with medication for 1 year. HTN currently uncontrolled, but she is not on medications. She does not check her BP at home. Her BP was 130/86 on 9/15/21. \par \par * Left nephrectomy [id number] for dysplasic kidney with atopic ureter.  ACP labs reviewed. Her most recent creatinine was 1.02 in September 2021. Labs from September 2021 from Dr. Randolph reviewed. . She had no proteinuria or hematuria on U/A this year. US on 9/21 showed 14 cm right kidney, echogenic, without hydro. Upper pole caliectasis (chronic) noted by Dr. Randolph. \par \par * She self catheterizes through a green in her umbilicus. She catheterizes every 3 hours. \par \par BMI 29.8. \par \par * She requires birth control to reduce peritoneal cyst fluid per urology. She says that this hasn't raised her blood pressure. (This was started in September.)

## 2022-08-15 ENCOUNTER — APPOINTMENT (OUTPATIENT)
Dept: NEPHROLOGY | Facility: CLINIC | Age: 27
End: 2022-08-15

## 2022-08-15 VITALS — DIASTOLIC BLOOD PRESSURE: 87 MMHG | HEART RATE: 80 BPM | SYSTOLIC BLOOD PRESSURE: 113 MMHG

## 2022-08-15 VITALS — WEIGHT: 150 LBS | HEIGHT: 60 IN | BODY MASS INDEX: 29.45 KG/M2

## 2022-08-15 DIAGNOSIS — N18.9 CHRONIC KIDNEY DISEASE, UNSPECIFIED: ICD-10-CM

## 2022-08-15 DIAGNOSIS — E66.3 OVERWEIGHT: ICD-10-CM

## 2022-08-15 DIAGNOSIS — Z90.5 ACQUIRED ABSENCE OF KIDNEY: ICD-10-CM

## 2022-08-15 DIAGNOSIS — D64.9 ANEMIA, UNSPECIFIED: ICD-10-CM

## 2022-08-15 DIAGNOSIS — I10 ESSENTIAL (PRIMARY) HYPERTENSION: ICD-10-CM

## 2022-08-15 PROCEDURE — 99214 OFFICE O/P EST MOD 30 MIN: CPT

## 2022-08-15 NOTE — CONSULT LETTER
[FreeTextEntry1] : Dear Dr. Randolph,\par \par I had the pleasure of seeing Aurora Hill in followup for kidney disease. My note is attached.\par \par Thank you for the opportunity to participate in the care of your patient.\par \par Please call me on my mobile phone at [phone] or in the office at [phone] if you have any questions.\par \par Best regards,\par \par Enoch\par \par Enoch Andre MD, FACP, FASN\par [address], [place]\par [email or web address]\par Office: [phone]\par Mobile: [phone]

## 2022-08-15 NOTE — HISTORY OF PRESENT ILLNESS
[FreeTextEntry1] : Kindly referred for HTN and nephrectomy by Dr. Crystal Randolph. Urologist: Dr. Crystal Randolph (Fax [phone]) She has a documented allergy to Covid vaccine (by allergist). LABS [facility] ONLY. \par \par * BP controlled on no medications.  No lightheadedness. No CP/SOB. She self catheterizes through a green in her umbilicus. She catheterizes 4 - 5 times daily. She self catheterizes through a green in her umbilicus. She catheterizes every 3 hours. * Advised to obtain PCP. * 45 mg albuminuria. * HGB 11.5. No melena/hematochezia. \par \par Previous history (07Apr22): * HTN controlled.  at home. No lightheadedness. Compliant with medications.  * She self catheterizes through a green in her umbilicus. She catheterizes every 3 hours.* Overweight stable.  *  CKD stable s/p nephrectomy, creatinine 1.11 and normal cystatin C eGFR\par \par Previous history (25Jan22): *  CKD stable s/p nephrectomy, creatinine 1.11 and normal cystatin C eGFR. * She stopped amlodipine because she felt it was giving her headaches. HTN borderline controlled, 120s/70s.   * She self catheterizes through a green in her umbilicus. She catheterizes every 3 hours.* Overweight stable. \par \par Previous history (30Nov21): * She has not yet checked BP at home.  * She self catheterizes through a green in her umbilicus. She catheterizes every 3 hours.  * CKD stable s/p nephrectomy, creatinine 1.11 and normal cystatin C eGFR> \par \par Previous history (26Oct21): * HTN initially diagnosed age 10. She was treated with medication for 1 year. HTN currently uncontrolled, but she is not on medications. She does not check her BP at home. Her BP was 130/86 on 9/15/21. \par \par * Left nephrectomy [id number] for dysplasic kidney with atopic ureter.  ACP labs reviewed. Her most recent creatinine was 1.02 in September 2021. Labs from September 2021 from Dr. Randolph reviewed. . She had no proteinuria or hematuria on U/A this year. US on 9/21 showed 14 cm right kidney, echogenic, without hydro. Upper pole caliectasis (chronic) noted by Dr. Randolph. \par \par * She self catheterizes through a green in her umbilicus. She catheterizes every 3 hours. \par \par BMI 29.8. \par \par * She requires birth control to reduce peritoneal cyst fluid per urology. She says that this hasn't raised her blood pressure. (This was started in September.)

## 2022-08-15 NOTE — PHYSICAL EXAM
[General Appearance - Alert] : alert [General Appearance - In No Acute Distress] : in no acute distress [Neck Appearance] : the appearance of the neck was normal [Neck Cervical Mass (___cm)] : no neck mass was observed [Jugular Venous Distention Increased] : there was no jugular-venous distention [Thyroid Diffuse Enlargement] : the thyroid was not enlarged [Thyroid Nodule] : there were no palpable thyroid nodules [Auscultation Breath Sounds / Voice Sounds] : lungs were clear to auscultation bilaterally [Heart Sounds] : normal S1 and S2 [Heart Rate And Rhythm] : heart rate was normal and rhythm regular [Heart Sounds Gallop] : no gallops [Murmurs] : no murmurs [Heart Sounds Pericardial Friction Rub] : no pericardial rub [Edema] : there was no peripheral edema [Abdomen Tenderness] : non-tender [Abdomen Soft] : soft [] : no hepato-splenomegaly [Abdomen Mass (___ Cm)] : no abdominal mass palpated [Cervical Lymph Nodes Enlarged Posterior Bilaterally] : posterior cervical [Cervical Lymph Nodes Enlarged Anterior Bilaterally] : anterior cervical [Supraclavicular Lymph Nodes Enlarged Bilaterally] : supraclavicular

## 2022-08-15 NOTE — ASSESSMENT
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 White coat effect. \par * Follow BP at home. \par * The patient's blood pressure was checked with the Omron HEM-907XL using the SPRINT trial protocol after sitting quietly in an empty room with arm supported, back supported, and feet on the floor for 5 minutes. The average of 3 readings were taken.\par * A counseling information sheet on blood pressure and staying healthy which they have been instructed to read has been given.\par * The patient has been counseled to check their BP at home with an automatic arm cuff, write down the readings, and reach me directly on the phone immediately if they are persistently > 180 systolic or if SBP is less than 100 or if lightheadedness develops. They were counseled to bring in all blood pressure readings and medications next visit.\par * The patient has been counseled that regular office followup (at least every 4 months for now)  is important for monitoring and for their health, and that it is their responsibility to make follow up appointments.\par * The patient also has been counseled that they must never stop or change any medications without discussing this with me (or another physician). \par \par # Overweight.\par * Weight loss. 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.\par * Recheck labs.\par * Absolute kidney benefits/risks of SGLT2i in this clinical situation are uncertain.\par * Therapies for kidney disease: blood pressure control; other evidence-based therapies including exercise, a plant-based lower oxalate diet, and 400 mcg folic acid daily\par * Cardiovascular disease prevention: counseling on healthy diet, physical activity, weight loss, alcohol limitation, blood pressure control\par * A counseling information sheet on CKD which they have been instructed to read has been given.\par * The patient has been counseled that chronic kidney disease is a significant condition and regular office followup with me (at least every 4 months for now) is important for monitoring and their health, and that it is their responsibility to make a follow up appointment.\par * The patient has been counseled never to stop taking their medications without discussing it with me or another doctor.\par * The patient has been counseled on avoiding NSAIDs.\par * The patient has been counseled on risk of worsening kidney function and instructed to immediately call and speak with me and go immediately to ER with any severe symptoms, nausea, vomiting, diarrhea, chest pain, or shortness of breath.\par \par # Anemia.\par * Recheck labs.

## 2022-12-30 ENCOUNTER — APPOINTMENT (OUTPATIENT)
Dept: NEPHROLOGY | Facility: CLINIC | Age: 27
End: 2022-12-30

## 2025-02-13 NOTE — ED ADULT TRIAGE NOTE - BP NONINVASIVE SYSTOLIC (MM HG)
Chief Complaint   Patient presents with    Office Visit     Right big toenail ingrown        HPI: Sarina charles 12 year old presents to clinic as a New Patient with concern of  patient states concern for ingrown toenail.  Patient has been having this pain for several weeks that have been worsening.  Patient has tried to trim there nails appropriately however this has not helped.  Patient states this is significantly painful in shoe wear as well as palpating it.  Denies any other pedal complaints    PCP: Patel Pabon MD    ALLERGIES:   Allergen Reactions    Azithromycin         Current Outpatient Medications   Medication Sig Dispense Refill    mupirocin (BACTROBAN) 2 % ointment Apply thin layer to right toe 3 times daily for 10 days. 22 g 1    methylPREDNISolone (MEDROL DOSEPAK) 4 MG tablet Take 1 tablet by mouth as directed. follow package directions 21 tablet 0    drospirenone-ethinyl estradiol (ARSENIO) 3-0.02 MG per tablet Take 1 tablet by mouth daily. 28 tablet 11    clindamycin-benzoyl peroxide (BENZACLIN) 1-5 % gel Apply topically 2 times daily. 25 g 11    triamcinolone (KENALOG) 0.025 % cream Apply 1 Application topically in the morning and 1 Application in the evening. 30 g 0    ondansetron (ZOFRAN) 4 MG tablet Take 1 tablet by mouth every 8 hours as needed for Nausea. 30 tablet 0     No current facility-administered medications for this visit.        Review of Systems     No past medical history on file.     Past Surgical History:   Procedure Laterality Date    Remove tonsils/adenoids,<9 y/o Bilateral 12/21/2023    Tympanostomy tube placement  2016    ENT: [facility]        Social History     Tobacco Use   Smoking Status Never    Passive exposure: Yes   Smokeless Tobacco Never         Physical Exam  This is a pleasant patient. Alert and orientated times three. No acute distress  Body mass index is 40.43 kg/m².   Vitals:    02/13/25 1442   Resp: 16        Ortho Right Left Description   Weight bearing exam  reveals  normal competent arch normal normal    Inspection and palpation of the foot and ankle reveals swelling No No    Palpation of the joint reveals deformity no no    Palpation of the joints reveal pain no no    Range of of motion of the foot and ankle reveals crepitus no no    Inspection and palpation of the joints reveal ecchymosis no no    Inspection and palpation of the joints reveal stability normal normal    Muscle Atrophy no no    Manual muscle strength is noted to be 5/5 for plantar flexion and dorsiflexion normal normal      Vascular Right Left Description   Palpable dorsalis pedal pulse normal normal    Palpable posterior tibial pulse normal normal    Positive digital hair normal normal    Digits show cyanosis no no    Digits show clubbing no no    Deep calf Pain no no    Color Changes no no    Lymphedema no no    Varicosities no no      Dermatology Right Left Description   Extremity Temperature warm warm    Inspection and palpation of the skin reveals ulceration No No    Palpation of skin reveals pain no no    Inspection and palpation of the skin reveals color changes no no    Inspection and palpation of the skin reveals temperature changes no no    Inspection of nails     Right lateral hallux incurvated with mild paronychia no    Pre-ulcerative callus no no    Dryness of skin / xerosis no no    Scaling of skin no no    Maceration in interspaces no no    Hemosiderin deposits no no    Irritation or redness from bony deformities no no    Thick or shiny skin no no    redness or rubor no no    Ulcer found no no      Neurology Right Left Description   Deep tendon reflex is noted to be 2/4 for knee jerk normal normal    Vibratory sensation is intact normal normal    5.07 SWM is intact normal normal    Neurocratic sensation is intact normal normal    Negative Tinel sign normal normal          Assessment:  Ingrowing nail  (primary encounter diagnosis)       Plan:    Examining evaluated. Discussed the findings in  detail. All questions answered and discussed.      Discussed ingrown toenail occurs when the corner or side of a toenail grows into the surrounding soft flesh.  Discussed symptoms of pain and tenderness, inflamed skin, swelling, infection.  If infection is suspected this should be treated immediately.  Discussed potential causes including overgrowth of nail fold caused by abnormal pressures, tight-fitting shoes, irritation, injuries.  Discussed how you can be prone to this due to nail shape.  Discuss conservative care a keeping feet clean and washing them regularly, changing socks frequently, decreasing abnormal pressures, gently pushing the skin away from the nail, moisturize the area with creams and ointments.  Discuss if conservative care has not proceeded to resolution a definitive option of a procedure of a nail avulsion or partial nail avulsion maybe recommended.    Discussed the difference between an avulsion and matrixectomy. Discussed the possibility of re-occurrence, and patient understands this.    Patient elects to Proceed with procedure below    Matrixectomy of right hallux lateral border  Procedure-    Risks, benefits, options, and alternatives were discussed. Patient understands the risk and benefits and is amicable to proceed. Consent obtained. Risks discussed include but are not limited to bleeding, infection, dehiscence, scarring, need for reoperation, un-acceptable appearance, asymmetry, and sensation loss permanent or temporary. All questions answered to the patients satisfaction. Patient opts to proceed.     Patient was identified and palced supine on the table. Area was numbed using 3 cc of 0.5% marcaine plain and 1% lidocaine plain in a 1-1 mixture. The right hallux lateral border was then prepped with betadine. A tourniquet was applied. The offending border right hallux lateral border was removed. Phenol was placed in right hallux lateral border area for 3 applications of 30 seconds. A dry  sterile dressing with antibiotic oitment was applied. Patient tolerated procedure well. Instructions on post procedure care were given and patient understands. Patient will follow up in two weeks or sooner if problems arise.     Discussed post op care and dispensed instructions on sheet.     Remove dressing in 24 hours from time of procedure. Can be helpful to remove dressing by putting toe in EPSOM soaks (as instructed by EPSOM bag). If remaining part of dressing is sticking on toe pour a little hydrogen peroxide to help release the rest of the dressing. After the initial 24 hours place ointment (neopsporin, triple antibiotic ointment, etc.) a little dab on top of wound site followed by bandaid. Change bandaid once or twice a day. Continue with EPSOM soaks daily once or twice daily for 2 weeks. Follow up in 2 weeks. If wound appears well stop ointment and EPSOM Soaks at two weeks and continue with bandaid alone daily. Please call office for questions.       No orders of the defined types were placed in this encounter.        2/13/2025 3:12 PM   Mk Krause DPM            130